# Patient Record
Sex: MALE | Race: WHITE
[De-identification: names, ages, dates, MRNs, and addresses within clinical notes are randomized per-mention and may not be internally consistent; named-entity substitution may affect disease eponyms.]

---

## 2019-07-25 ENCOUNTER — HOSPITAL ENCOUNTER (INPATIENT)
Dept: HOSPITAL 35 - 2N | Age: 69
LOS: 4 days | Discharge: HOME | DRG: 314 | End: 2019-07-29
Attending: INTERNAL MEDICINE | Admitting: INTERNAL MEDICINE
Payer: COMMERCIAL

## 2019-07-25 VITALS — WEIGHT: 193.1 LBS | BODY MASS INDEX: 27.03 KG/M2 | HEIGHT: 71 IN

## 2019-07-25 VITALS — DIASTOLIC BLOOD PRESSURE: 62 MMHG | SYSTOLIC BLOOD PRESSURE: 123 MMHG

## 2019-07-25 DIAGNOSIS — I11.0: ICD-10-CM

## 2019-07-25 DIAGNOSIS — T82.538A: Primary | ICD-10-CM

## 2019-07-25 DIAGNOSIS — Z87.891: ICD-10-CM

## 2019-07-25 DIAGNOSIS — Z95.2: ICD-10-CM

## 2019-07-25 DIAGNOSIS — E78.5: ICD-10-CM

## 2019-07-25 DIAGNOSIS — D68.59: ICD-10-CM
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
## 2019-07-25 DIAGNOSIS — E78.00: ICD-10-CM

## 2019-07-25 DIAGNOSIS — I49.5: ICD-10-CM

## 2019-07-25 DIAGNOSIS — Z95.0: ICD-10-CM

## 2019-07-25 DIAGNOSIS — I50.23: ICD-10-CM

## 2019-07-25 DIAGNOSIS — E87.6: ICD-10-CM

## 2019-07-25 DIAGNOSIS — I48.0: ICD-10-CM
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
## 2019-07-25 DIAGNOSIS — T81.31XA: ICD-10-CM

## 2019-07-25 LAB
ALBUMIN SERPL-MCNC: 4.2 G/DL (ref 3.4–5)
ALT SERPL-CCNC: 30 U/L (ref 30–65)
ANION GAP SERPL CALC-SCNC: 8 MMOL/L (ref 7–16)
AST SERPL-CCNC: 30 U/L (ref 15–37)
BASOPHILS NFR BLD AUTO: 1 % (ref 0–2)
BILIRUB SERPL-MCNC: 1.8 MG/DL
BUN SERPL-MCNC: 19 MG/DL (ref 7–18)
CALCIUM SERPL-MCNC: 9.4 MG/DL (ref 8.5–10.1)
CHLORIDE SERPL-SCNC: 90 MMOL/L (ref 98–107)
CO2 SERPL-SCNC: 31 MMOL/L (ref 21–32)
CREAT SERPL-MCNC: 1 MG/DL (ref 0.7–1.3)
EOSINOPHIL NFR BLD: 0.5 % (ref 0–3)
ERYTHROCYTE [DISTWIDTH] IN BLOOD BY AUTOMATED COUNT: 15.4 % (ref 10.5–14.5)
GLUCOSE SERPL-MCNC: 94 MG/DL (ref 74–106)
GRANULOCYTES NFR BLD MANUAL: 72.3 % (ref 36–66)
HCT VFR BLD CALC: 39.8 % (ref 42–52)
HGB BLD-MCNC: 13.4 GM/DL (ref 14–18)
LYMPHOCYTES NFR BLD AUTO: 17.6 % (ref 24–44)
MCH RBC QN AUTO: 31.8 PG (ref 26–34)
MCHC RBC AUTO-ENTMCNC: 33.8 G/DL (ref 28–37)
MCV RBC: 93.9 FL (ref 80–100)
MONOCYTES NFR BLD: 8.6 % (ref 1–8)
NEUTROPHILS # BLD: 5.6 THOU/UL (ref 1.4–8.2)
PLATELET # BLD: 152 THOU/UL (ref 150–400)
POTASSIUM SERPL-SCNC: 3.4 MMOL/L (ref 3.5–5.1)
PROT SERPL-MCNC: 8.1 G/DL (ref 6.4–8.2)
RBC # BLD AUTO: 4.23 MIL/UL (ref 4.5–6)
SODIUM SERPL-SCNC: 129 MMOL/L (ref 136–145)
WBC # BLD AUTO: 7.7 THOU/UL (ref 4–11)

## 2019-07-25 PROCEDURE — 10081 I&D PILONIDAL CYST COMP: CPT

## 2019-07-25 PROCEDURE — B24BZZ4 ULTRASONOGRAPHY OF HEART WITH AORTA, TRANSESOPHAGEAL: ICD-10-PCS

## 2019-07-25 PROCEDURE — 10797: CPT

## 2019-07-25 NOTE — NUR
PATIENT A DIRECT ADMIT. ADMISSION HX AND ASSESSMENT COMPLETED. ORDERS NOTED.
VSS. DENIED HAVING PAIN OR DISCOMFORT. PT ORIENTED TO THE ROOM AND THE CALL
SYSTEM. WILL CONTINUE TO MONITOR.

## 2019-07-26 VITALS — DIASTOLIC BLOOD PRESSURE: 51 MMHG | SYSTOLIC BLOOD PRESSURE: 125 MMHG

## 2019-07-26 VITALS — SYSTOLIC BLOOD PRESSURE: 112 MMHG | DIASTOLIC BLOOD PRESSURE: 60 MMHG

## 2019-07-26 VITALS — DIASTOLIC BLOOD PRESSURE: 53 MMHG | SYSTOLIC BLOOD PRESSURE: 111 MMHG

## 2019-07-26 VITALS — SYSTOLIC BLOOD PRESSURE: 109 MMHG | DIASTOLIC BLOOD PRESSURE: 56 MMHG

## 2019-07-26 LAB
INR PPP: 2.4
PROTHROMBIN TIME: 25.2 SECONDS (ref 9.3–11.4)

## 2019-07-26 NOTE — NUR
NO OVERNIGHT EVENTS. PT TO HAVE AUREA TODAY, AND HAS BEEN NPO S[place]CE [place] IN
PREP FOR PROCEDURE. PT WAS CONCERNED ABOUT TAKING LASIX SO SHORTLY BEFORE
PROCEDURE WHERE HE WOULD NEED TO VOID DURING PROCEDURE. LASIX GIVEN A FEW
HOURS EARLY, BUT PT IS STILL CONCERNED. VERIFIED THAT PT KNEW WHAT THE
PROCEDURE WAS AND CONSENT SIGNED. WILL CONTINUE TO MONITOR.

## 2019-07-26 NOTE — NUR
ASSUMED CARE @ 0700. PT ALERT AND ORIENTED. VSS. DENIED HAVING PAIN OR
DISCOMFORT. HAD AUREA THIS AM. NO CARDIAC OR RESPIRATORY DISTRESS NOTED. WILL
CONTINUE TO MONITOR.

## 2019-07-27 VITALS — DIASTOLIC BLOOD PRESSURE: 60 MMHG | SYSTOLIC BLOOD PRESSURE: 119 MMHG

## 2019-07-27 VITALS — DIASTOLIC BLOOD PRESSURE: 59 MMHG | SYSTOLIC BLOOD PRESSURE: 117 MMHG

## 2019-07-27 VITALS — DIASTOLIC BLOOD PRESSURE: 59 MMHG | SYSTOLIC BLOOD PRESSURE: 123 MMHG

## 2019-07-27 VITALS — SYSTOLIC BLOOD PRESSURE: 121 MMHG | DIASTOLIC BLOOD PRESSURE: 79 MMHG

## 2019-07-27 VITALS — DIASTOLIC BLOOD PRESSURE: 58 MMHG | SYSTOLIC BLOOD PRESSURE: 121 MMHG

## 2019-07-27 LAB
ANION GAP SERPL CALC-SCNC: 10 MMOL/L (ref 7–16)
BUN SERPL-MCNC: 21 MG/DL (ref 7–18)
CALCIUM SERPL-MCNC: 9.2 MG/DL (ref 8.5–10.1)
CHLORIDE SERPL-SCNC: 95 MMOL/L (ref 98–107)
CO2 SERPL-SCNC: 27 MMOL/L (ref 21–32)
CREAT SERPL-MCNC: 0.9 MG/DL (ref 0.7–1.3)
GLUCOSE SERPL-MCNC: 102 MG/DL (ref 74–106)
INR PPP: 1.7
POTASSIUM SERPL-SCNC: 3.5 MMOL/L (ref 3.5–5.1)
PROTHROMBIN TIME: 18.1 SECONDS (ref 9.3–11.4)
SODIUM SERPL-SCNC: 132 MMOL/L (ref 136–145)

## 2019-07-27 NOTE — NUR
PT ALERT AND ORIENTED. VSS. DENIED HAVING PAIN OR DISCOMFORT. NO CARDIAC OR
RESPIRATORY DISTRESS NOTED. WILL CONTINUE TO MONITOR.

## 2019-07-28 VITALS — SYSTOLIC BLOOD PRESSURE: 113 MMHG | DIASTOLIC BLOOD PRESSURE: 61 MMHG

## 2019-07-28 VITALS — DIASTOLIC BLOOD PRESSURE: 56 MMHG | SYSTOLIC BLOOD PRESSURE: 109 MMHG

## 2019-07-28 VITALS — DIASTOLIC BLOOD PRESSURE: 57 MMHG | SYSTOLIC BLOOD PRESSURE: 118 MMHG

## 2019-07-28 VITALS — SYSTOLIC BLOOD PRESSURE: 114 MMHG | DIASTOLIC BLOOD PRESSURE: 54 MMHG

## 2019-07-28 VITALS — SYSTOLIC BLOOD PRESSURE: 117 MMHG | DIASTOLIC BLOOD PRESSURE: 58 MMHG

## 2019-07-28 LAB
ANION GAP SERPL CALC-SCNC: 10 MMOL/L (ref 7–16)
BUN SERPL-MCNC: 21 MG/DL (ref 7–18)
CALCIUM SERPL-MCNC: 9.6 MG/DL (ref 8.5–10.1)
CHLORIDE SERPL-SCNC: 95 MMOL/L (ref 98–107)
CO2 SERPL-SCNC: 27 MMOL/L (ref 21–32)
CREAT SERPL-MCNC: 0.8 MG/DL (ref 0.7–1.3)
GLUCOSE SERPL-MCNC: 109 MG/DL (ref 74–106)
INR PPP: 1.4
POTASSIUM SERPL-SCNC: 3.8 MMOL/L (ref 3.5–5.1)
PROTHROMBIN TIME: 15 SECONDS (ref 9.3–11.4)
SODIUM SERPL-SCNC: 132 MMOL/L (ref 136–145)

## 2019-07-28 NOTE — NUR
ASSUMED PT CARE AT 1900. PT A/OX4, VITAL SIGNS STABLE, ASSESSMENT AS CHARTED.
NO COMPLAINTS OF PAIN. RESTED WELL THROUGH THE NIGHT. NO ACUTE CHANGES THROUGH
THE NIGHT. PROGRESSING TOWARD PLAN OF CARE. WILL KILEY TO MONITOR.

## 2019-07-28 NOTE — NUR
ASSESSMENT AS CHARTED. PT ALERT AND ORIENTED. VSS. DENIED HAVING PAIN OR
DISCOMFORT. NO CONCERNS AT THIS TIME. WILL CONTINUE TO MONITOR.

## 2019-07-29 VITALS — SYSTOLIC BLOOD PRESSURE: 122 MMHG | DIASTOLIC BLOOD PRESSURE: 50 MMHG

## 2019-07-29 VITALS — DIASTOLIC BLOOD PRESSURE: 57 MMHG | SYSTOLIC BLOOD PRESSURE: 117 MMHG

## 2019-07-29 VITALS — DIASTOLIC BLOOD PRESSURE: 50 MMHG | SYSTOLIC BLOOD PRESSURE: 122 MMHG

## 2019-07-29 LAB
ANION GAP SERPL CALC-SCNC: 9 MMOL/L (ref 7–16)
BUN SERPL-MCNC: 21 MG/DL (ref 7–18)
CALCIUM SERPL-MCNC: 10 MG/DL (ref 8.5–10.1)
CHLORIDE SERPL-SCNC: 93 MMOL/L (ref 98–107)
CO2 SERPL-SCNC: 31 MMOL/L (ref 21–32)
CREAT SERPL-MCNC: 0.9 MG/DL (ref 0.7–1.3)
GLUCOSE SERPL-MCNC: 136 MG/DL (ref 74–106)
INR PPP: 1.4
POTASSIUM SERPL-SCNC: 3.5 MMOL/L (ref 3.5–5.1)
PROTHROMBIN TIME: 14.4 SECONDS (ref 9.3–11.4)
SODIUM SERPL-SCNC: 133 MMOL/L (ref 136–145)

## 2019-07-29 NOTE — NUR
ASSUMED PT CARE AT 1900. PT A/OX4, VITALS SIGNS STABLE. ASSESSMENT AS CHARTED.
NO COMPLAINTS OF PAIN. REQUESTED HIS MEDS EARLY SO HE COULD SLEEP. PT RESTED
WELL THROUGH THE NIGHT. PROGRESSING WELL TOWARDS PLAN OF CARE. WILL CONTINUE
TO MONITOR.

## 2019-07-29 NOTE — NUR
ASSUMED CARE OF PT AT SHIFT CHANGE ASSESSMENT AS CHARTED. MEDS GIVEN PER MAR.
PT ALERT AND ORIENTED, VSS, DENIES PAIN, DENIES SOB/CP, O2 SATS WNL ON RA. PT
UP AD TOMMY TOLERATES WELL. DC ORDERS ACKNOWLEDGED AND IMPLEMENTED. DC PAPERWORK
DISCUSSED WITH PT, COMMUNICATES UNDERSTANDING. PT LEFT UNIT AT APPROX 1115
WITH SPOUSE AND ALL BELONGINGS. IV REMOVED, TELE REMOVED.

## 2019-09-16 ENCOUNTER — HOSPITAL ENCOUNTER (OUTPATIENT)
Dept: HOSPITAL 61 - PCVCIMAG | Age: 69
Discharge: HOME | End: 2019-09-16
Attending: INTERNAL MEDICINE
Payer: MEDICARE

## 2019-09-16 DIAGNOSIS — I48.0: ICD-10-CM

## 2019-09-16 DIAGNOSIS — I49.5: ICD-10-CM

## 2019-09-16 DIAGNOSIS — I11.0: Primary | ICD-10-CM

## 2019-09-16 DIAGNOSIS — I49.3: ICD-10-CM

## 2019-09-16 DIAGNOSIS — Z95.0: ICD-10-CM

## 2019-09-16 DIAGNOSIS — I35.0: ICD-10-CM

## 2019-09-16 DIAGNOSIS — A69.20: ICD-10-CM

## 2019-09-16 DIAGNOSIS — I50.23: ICD-10-CM

## 2019-09-16 DIAGNOSIS — Z95.2: ICD-10-CM

## 2019-09-16 DIAGNOSIS — I42.9: ICD-10-CM

## 2019-09-16 PROCEDURE — 80061 LIPID PANEL: CPT

## 2019-09-16 PROCEDURE — G0463 HOSPITAL OUTPT CLINIC VISIT: HCPCS

## 2019-09-16 PROCEDURE — 36415 COLL VENOUS BLD VENIPUNCTURE: CPT

## 2019-09-16 PROCEDURE — 93005 ELECTROCARDIOGRAM TRACING: CPT

## 2019-09-16 PROCEDURE — 93306 TTE W/DOPPLER COMPLETE: CPT

## 2019-09-16 NOTE — PCVCIMAG
--------------- APPROVED REPORT --------------





Study performed:  2019 09:57:24



EXAM: Comprehensive 2D, Doppler, and color-flow 

Echocardiogram

Patient Location: Echo lab

Status:  routine



BSA:         2.09

HR: 81 bpmBP:          110/56 mmHg

Rhythm: Pacemaker, PVCs



Other Information 

Study Quality: Adequate



Indications

Congestive Heart Failure

Atrial Fibrillation

Pacemaker

[employer]. Pedrito bioprosthetic aortic valve replacement



2D Dimensions

IVSd:  12.89 (7-11mm)LVOT Diam:  22.58 (18-24mm) 

LVDd:  60.69 mm

PWd:  10.99 (7-11mm)Ascending Ao:  44.27 (22-36mm)

LVDs:  44.96 (25-40mm)

Left Atrium:  46.83 (27-40mm)

Aortic Root:  41.41 mm

LV Single Plane 4CH:  31.57 %

LV Single Plane 2CH:  45.21 %



Volumes

Left Atrial Volume (Systole)

Single Plane 4CH:  95.36 mLSingle Plane 2CH:  152.63 mL

LA ESV Index:  58.00 mL/m2



Aortic Valve

AoV Peak Hardy.:  2.60 m/s

AO Peak Gr.:  27.22 mmHgLVOT Max P.50 mmHg

AO Mean Gr.:  13.08 mmHgLVOT Mean P.69 mmHg

AO V2 Mean:  1.64 m/sLVOT Max V:  1.17 m/s

AO V2 VTI:  54.35 cmLVOT Mean V:  0.76 m/s

MARIA DEL CARMEN (VTI):  1.86 [id number] V1 VTI:  25.30 cm

MARIA DEL CARMEN Vmax: 1.80 cm2

SV (LVOT):  101.25 mL



Mitral Valve

IVRT:  103.81 ms



Pulmonary Valve

PV Peak Hardy.:  0.79 m/sPV Peak Gr.:  2.59 mmHg



Tricuspid Valve

TR Peak Hardy.:  3.61 m/s

TR Peak Gr.:  52.70 mmHg



Left Ventricle

Left ventricle is mildly dilated. There is normal LV segmental wall 

motion. Borderline concentric left ventricular hypertrophy. Left 

ventricular systolic function is mild to moderately decreased. LVEF 

is 40%. This study is not technically sufficient to allow evaluation 

of the LV diastolic function due to atrial fibrillation, PVCs.



Right Ventricle

The right ventricle is normal size. The right ventricular systolic 

function is normal. Pacemaker lead is present in the right ventricle. 



Atria

Left atrium is severely dilated. Right atrium is moderately dilated. 

Pacemaker lead is present in the right atrium.



Aortic Valve

[employer]. Pedrito bioprosthetic valve in the aortic position. Moderate aortic 

regurgitation. There is trace valvular aortic stenosis. Calculated 

aortic valve area is 1.8 cm2 with maximum pressure gradient of 27 

mmHg and mean pressure gradient of 13 mmHg.



Mitral Valve

The mitral valve is normal in structure. Moderate mitral 

regurgitation. No evidence of mitral valve stenosis.



Tricuspid Valve

The tricuspid valve is normal in structure. Moderate tricuspid 

regurgitation with PAP of 63 mmHg.



Pulmonic Valve

The pulmonary valve is normal in structure. Mild to moderate pulmonic 

regurgitation.



Great Vessels

Aortic root is dilated to 4.1 cm. Ascending aorta is dilated to 4.4 

cm. IVC is dilated and collapses >50% with 

inspiration.



Pericardium

There is no pericardial effusion. There is no pleural 

effusion.



<Conclusion>

Left ventricle is mildly dilated.

Borderline concentric left ventricular hypertrophy.

LVEF is 40%.

This study is not technically sufficient to allow evaluation of the 

LV diastolic function due to atrial fibrillation, PVCs.

The right ventricle is normal size.

Left atrium is severely dilated.

Pacemaker lead is present in the right ventricle.

Right atrium is moderately dilated.

Pacemaker lead is present in the right atrium.

St. Pedrito bioprosthetic valve in the aortic position.

Moderate aortic regurgitation.

There is trace valvular aortic stenosis. 

Calculated aortic valve area is 1.8 cm2 with maximum pressure 

gradient of 27 mmHg and mean pressure gradient of 13 mmHg.

Moderate mitral regurgitation.

Moderate tricuspid regurgitation with PAP of 63 mmHg.

Aortic root is dilated to 4.1 cm.

Ascending aorta is dilated to 4.4 cm.

There is no pericardial effusion.

## 2019-10-28 ENCOUNTER — HOSPITAL ENCOUNTER (INPATIENT)
Dept: HOSPITAL 90 - EDH | Age: 69
LOS: 2 days | Discharge: HOME | DRG: 151 | End: 2019-10-30
Attending: INTERNAL MEDICINE | Admitting: INTERNAL MEDICINE
Payer: MEDICARE

## 2019-10-28 VITALS — HEIGHT: 71 IN | BODY MASS INDEX: 24.51 KG/M2 | WEIGHT: 175.1 LBS

## 2019-10-28 DIAGNOSIS — D53.9: ICD-10-CM

## 2019-10-28 DIAGNOSIS — E11.9: ICD-10-CM

## 2019-10-28 DIAGNOSIS — T45.515A: ICD-10-CM

## 2019-10-28 DIAGNOSIS — I50.22: ICD-10-CM

## 2019-10-28 DIAGNOSIS — Z79.899: ICD-10-CM

## 2019-10-28 DIAGNOSIS — R04.0: Primary | ICD-10-CM

## 2019-10-28 DIAGNOSIS — F10.10: ICD-10-CM

## 2019-10-28 DIAGNOSIS — A77.0: ICD-10-CM

## 2019-10-28 DIAGNOSIS — I48.91: ICD-10-CM

## 2019-10-28 DIAGNOSIS — N17.9: ICD-10-CM

## 2019-10-28 DIAGNOSIS — Z95.0: ICD-10-CM

## 2019-10-28 DIAGNOSIS — D62: ICD-10-CM

## 2019-10-28 DIAGNOSIS — Z95.3: ICD-10-CM

## 2019-10-28 DIAGNOSIS — A69.20: ICD-10-CM

## 2019-10-28 DIAGNOSIS — E78.5: ICD-10-CM

## 2019-10-28 DIAGNOSIS — I11.0: ICD-10-CM

## 2019-10-28 DIAGNOSIS — E87.1: ICD-10-CM

## 2019-10-28 DIAGNOSIS — I35.0: ICD-10-CM

## 2019-10-28 DIAGNOSIS — Z79.01: ICD-10-CM

## 2019-10-28 DIAGNOSIS — M10.9: ICD-10-CM

## 2019-10-28 LAB
APTT PPP: 30.2 SEC (ref 26.3–35.5)
BASOPHILS NFR BLD AUTO: 0.3 % (ref 0–5)
BUN SERPL-MCNC: 69 MG/DL (ref 7–18)
CHLORIDE SERPL-SCNC: 95 MMOL/L (ref 101–111)
CO2 SERPL-SCNC: 32 MMOL/L (ref 21–32)
CREAT SERPL-MCNC: 2 MG/DL (ref 0.5–1.5)
EOSINOPHIL NFR BLD AUTO: 1.9 % (ref 0–8)
ERYTHROCYTE [DISTWIDTH] IN BLOOD BY AUTOMATED COUNT: 17.9 % (ref 11–15.5)
GFR SERPL CREATININE-BSD FRML MDRD: 35 ML/MIN (ref 60–?)
GLUCOSE SERPL-MCNC: 121 MG/DL (ref 70–105)
HCT VFR BLD AUTO: 24.5 % (ref 42–54)
INR PPP: 1.15 (ref 0.85–1.15)
IRON SATN MFR SERPL: 42.6 % (ref 30–44)
IRON SERPL-MCNC: 152 MCG/DL (ref 65–175)
LYMPHOCYTES NFR SPEC AUTO: 18.6 % (ref 21–51)
MCH RBC QN AUTO: 37.4 PG (ref 27–33)
MCHC RBC AUTO-ENTMCNC: 35.2 G/DL (ref 32–36)
MCV RBC AUTO: 106.3 FL (ref 79–99)
MONOCYTES NFR BLD AUTO: 9.6 % (ref 3–13)
NEUTROPHILS NFR BLD AUTO: 69.6 % (ref 40–77)
NRBC BLD MANUAL-RTO: 0.1 % (ref 0–0.19)
PLATELET # BLD AUTO: 195 K/UL (ref 130–400)
POTASSIUM SERPL-SCNC: 5.1 MMOL/L (ref 3.5–5.1)
PROTHROMBIN TIME: 12 SEC (ref 9.6–11.6)
RBC # BLD AUTO: 2.3 MIL/UL (ref 4.5–6.2)
RBC MORPH BLD: (no result)
SODIUM SERPL-SCNC: 133 MMOL/L (ref 136–145)
TIBC SERPL-MCNC: 356 MCG/DL (ref 250–450)
WBC # BLD AUTO: 7 K/UL (ref 4.8–10.8)

## 2019-10-28 PROCEDURE — 36415 COLL VENOUS BLD VENIPUNCTURE: CPT

## 2019-10-28 PROCEDURE — 83540 ASSAY OF IRON: CPT

## 2019-10-28 PROCEDURE — 86901 BLOOD TYPING SEROLOGIC RH(D): CPT

## 2019-10-28 PROCEDURE — 86900 BLOOD TYPING SEROLOGIC ABO: CPT

## 2019-10-28 PROCEDURE — 80048 BASIC METABOLIC PNL TOTAL CA: CPT

## 2019-10-28 PROCEDURE — 85610 PROTHROMBIN TIME: CPT

## 2019-10-28 PROCEDURE — 84484 ASSAY OF TROPONIN QUANT: CPT

## 2019-10-28 PROCEDURE — 80053 COMPREHEN METABOLIC PANEL: CPT

## 2019-10-28 PROCEDURE — 84443 ASSAY THYROID STIM HORMONE: CPT

## 2019-10-28 PROCEDURE — 86850 RBC ANTIBODY SCREEN: CPT

## 2019-10-28 PROCEDURE — 85730 THROMBOPLASTIN TIME PARTIAL: CPT

## 2019-10-28 PROCEDURE — 85025 COMPLETE CBC W/AUTO DIFF WBC: CPT

## 2019-10-28 PROCEDURE — 85014 HEMATOCRIT: CPT

## 2019-10-28 PROCEDURE — 83550 IRON BINDING TEST: CPT

## 2019-10-28 PROCEDURE — 82948 REAGENT STRIP/BLOOD GLUCOSE: CPT

## 2019-10-28 PROCEDURE — 85018 HEMOGLOBIN: CPT

## 2019-10-29 LAB
ALBUMIN SERPL-MCNC: 3.7 G/DL (ref 3.5–5)
ALT SERPL-CCNC: 24 U/L (ref 12–78)
AST SERPL-CCNC: 26 U/L (ref 10–37)
BASOPHILS NFR BLD AUTO: 0.7 % (ref 0–5)
BILIRUB SERPL-MCNC: 1.6 MG/DL (ref 0.2–1)
BUN SERPL-MCNC: 52 MG/DL (ref 7–18)
CHLORIDE SERPL-SCNC: 100 MMOL/L (ref 101–111)
CO2 SERPL-SCNC: 27 MMOL/L (ref 21–32)
CREAT SERPL-MCNC: 1.3 MG/DL (ref 0.5–1.5)
EOSINOPHIL NFR BLD AUTO: 1.3 % (ref 0–8)
ERYTHROCYTE [DISTWIDTH] IN BLOOD BY AUTOMATED COUNT: 17.4 % (ref 11–15.5)
GFR SERPL CREATININE-BSD FRML MDRD: 58 ML/MIN (ref 60–?)
GLUCOSE SERPL-MCNC: 119 MG/DL (ref 70–105)
HCT VFR BLD AUTO: 24 % (ref 42–54)
HCT VFR BLD AUTO: 24.2 % (ref 42–54)
HCT VFR BLD AUTO: 24.3 % (ref 42–54)
HCT VFR BLD AUTO: 25.7 % (ref 42–54)
LYMPHOCYTES NFR SPEC AUTO: 18.5 % (ref 21–51)
MCH RBC QN AUTO: 36.9 PG (ref 27–33)
MCHC RBC AUTO-ENTMCNC: 34.8 G/DL (ref 32–36)
MCV RBC AUTO: 105.8 FL (ref 79–99)
MONOCYTES NFR BLD AUTO: 10 % (ref 3–13)
NEUTROPHILS NFR BLD AUTO: 69.5 % (ref 40–77)
NRBC BLD MANUAL-RTO: 0 % (ref 0–0.19)
PLATELET # BLD AUTO: 193 K/UL (ref 130–400)
POTASSIUM SERPL-SCNC: 4.6 MMOL/L (ref 3.5–5.1)
PROT SERPL-MCNC: 7.4 G/DL (ref 6–8.3)
RBC # BLD AUTO: 2.27 MIL/UL (ref 4.5–6.2)
SODIUM SERPL-SCNC: 138 MMOL/L (ref 136–145)
WBC # BLD AUTO: 6 K/UL (ref 4.8–10.8)

## 2019-10-29 RX ADMIN — ALLOPURINOL SCH MG: 100 TABLET ORAL at 17:00

## 2019-10-29 NOTE — NUR
DCP: HOME



 met with pt who lives with his wife Mary Crews . Pt repots he uses a cane 
prn, related to his gout and dizzy episodes from medication. Pt is able to complete ADLS, no 
in home care services. PCP is BROOK Reyna and he uses ail order rx. Plan is home at [place]

-------------------------------------------------------------------------------

Addendum: 10/29/19 at 1116 by ZORAN BOJORQUEZ 

-------------------------------------------------------------------------------

Amended: Links added.

## 2019-10-30 VITALS — SYSTOLIC BLOOD PRESSURE: 116 MMHG | DIASTOLIC BLOOD PRESSURE: 61 MMHG

## 2019-10-30 VITALS — DIASTOLIC BLOOD PRESSURE: 48 MMHG | SYSTOLIC BLOOD PRESSURE: 93 MMHG

## 2019-10-30 LAB
BUN SERPL-MCNC: 49 MG/DL (ref 7–18)
CHLORIDE SERPL-SCNC: 101 MMOL/L (ref 101–111)
CO2 SERPL-SCNC: 28 MMOL/L (ref 21–32)
CREAT SERPL-MCNC: 1.6 MG/DL (ref 0.5–1.5)
GFR SERPL CREATININE-BSD FRML MDRD: 46 ML/MIN (ref 60–?)
GLUCOSE SERPL-MCNC: 160 MG/DL (ref 70–105)
HCT VFR BLD AUTO: 25.1 % (ref 42–54)
HCT VFR BLD AUTO: 25.9 % (ref 42–54)
POTASSIUM SERPL-SCNC: 4.4 MMOL/L (ref 3.5–5.1)
SODIUM SERPL-SCNC: 138 MMOL/L (ref 136–145)

## 2019-10-30 RX ADMIN — METFORMIN HYDROCHLORIDE SCH MG: 500 TABLET ORAL at 08:00

## 2019-10-30 RX ADMIN — ALLOPURINOL SCH MG: 100 TABLET ORAL at 17:00

## 2019-10-30 RX ADMIN — SODIUM CHLORIDE SCH UNIT: 9 INJECTION, SOLUTION INTRAVENOUS at 16:30

## 2019-10-30 RX ADMIN — SODIUM CHLORIDE SCH UNIT: 9 INJECTION, SOLUTION INTRAVENOUS at 11:30

## 2019-10-30 RX ADMIN — METFORMIN HYDROCHLORIDE SCH MG: 500 TABLET ORAL at 17:00

## 2020-06-06 ENCOUNTER — HOSPITAL ENCOUNTER (INPATIENT)
Dept: HOSPITAL 35 - 2N | Age: 70
LOS: 5 days | Discharge: ANOTHER HOSPITAL | DRG: 377 | End: 2020-06-11
Attending: INTERNAL MEDICINE | Admitting: INTERNAL MEDICINE
Payer: COMMERCIAL

## 2020-06-06 VITALS — DIASTOLIC BLOOD PRESSURE: 51 MMHG | SYSTOLIC BLOOD PRESSURE: 107 MMHG

## 2020-06-06 VITALS — DIASTOLIC BLOOD PRESSURE: 50 MMHG | SYSTOLIC BLOOD PRESSURE: 100 MMHG

## 2020-06-06 VITALS — SYSTOLIC BLOOD PRESSURE: 113 MMHG | DIASTOLIC BLOOD PRESSURE: 47 MMHG

## 2020-06-06 VITALS — SYSTOLIC BLOOD PRESSURE: 98 MMHG | DIASTOLIC BLOOD PRESSURE: 47 MMHG

## 2020-06-06 VITALS — SYSTOLIC BLOOD PRESSURE: 110 MMHG | DIASTOLIC BLOOD PRESSURE: 55 MMHG

## 2020-06-06 VITALS — DIASTOLIC BLOOD PRESSURE: 50 MMHG | SYSTOLIC BLOOD PRESSURE: 112 MMHG

## 2020-06-06 VITALS — BODY MASS INDEX: 26.43 KG/M2 | WEIGHT: 188.8 LBS | HEIGHT: 70.98 IN

## 2020-06-06 VITALS — DIASTOLIC BLOOD PRESSURE: 48 MMHG | SYSTOLIC BLOOD PRESSURE: 114 MMHG

## 2020-06-06 DIAGNOSIS — E43: ICD-10-CM

## 2020-06-06 DIAGNOSIS — M10.9: ICD-10-CM

## 2020-06-06 DIAGNOSIS — E78.5: ICD-10-CM

## 2020-06-06 DIAGNOSIS — K44.9: ICD-10-CM

## 2020-06-06 DIAGNOSIS — K22.70: ICD-10-CM

## 2020-06-06 DIAGNOSIS — I42.9: ICD-10-CM

## 2020-06-06 DIAGNOSIS — K64.8: ICD-10-CM

## 2020-06-06 DIAGNOSIS — D36.9: ICD-10-CM

## 2020-06-06 DIAGNOSIS — D68.9: ICD-10-CM

## 2020-06-06 DIAGNOSIS — Z71.41: ICD-10-CM

## 2020-06-06 DIAGNOSIS — Z80.0: ICD-10-CM

## 2020-06-06 DIAGNOSIS — D62: ICD-10-CM

## 2020-06-06 DIAGNOSIS — N40.0: ICD-10-CM

## 2020-06-06 DIAGNOSIS — N17.0: ICD-10-CM

## 2020-06-06 DIAGNOSIS — Z95.0: ICD-10-CM

## 2020-06-06 DIAGNOSIS — Z20.828: ICD-10-CM

## 2020-06-06 DIAGNOSIS — I35.0: ICD-10-CM

## 2020-06-06 DIAGNOSIS — F43.10: ICD-10-CM

## 2020-06-06 DIAGNOSIS — I25.10: ICD-10-CM

## 2020-06-06 DIAGNOSIS — I48.0: ICD-10-CM

## 2020-06-06 DIAGNOSIS — K63.5: ICD-10-CM

## 2020-06-06 DIAGNOSIS — I49.5: ICD-10-CM

## 2020-06-06 DIAGNOSIS — Z79.01: ICD-10-CM

## 2020-06-06 DIAGNOSIS — E11.9: ICD-10-CM

## 2020-06-06 DIAGNOSIS — I11.0: ICD-10-CM

## 2020-06-06 DIAGNOSIS — K29.71: Primary | ICD-10-CM

## 2020-06-06 DIAGNOSIS — Z95.2: ICD-10-CM

## 2020-06-06 DIAGNOSIS — I50.22: ICD-10-CM

## 2020-06-06 LAB
ALBUMIN SERPL-MCNC: 3.1 G/DL (ref 3.4–5)
ALT SERPL-CCNC: 18 U/L (ref 30–65)
ANION GAP SERPL CALC-SCNC: 5 MMOL/L (ref 7–16)
ANISOCYTOSIS BLD QL SMEAR: (no result)
AST SERPL-CCNC: 23 U/L (ref 15–37)
BASO STIPL BLD QL SMEAR: (no result)
BILIRUB SERPL-MCNC: 0.4 MG/DL (ref 0.2–1)
BUN SERPL-MCNC: 126 MG/DL (ref 7–18)
CALCIUM SERPL-MCNC: 8.8 MG/DL (ref 8.5–10.1)
CHLORIDE SERPL-SCNC: 106 MMOL/L (ref 98–107)
CO2 SERPL-SCNC: 28 MMOL/L (ref 21–32)
CREAT SERPL-MCNC: 2.6 MG/DL (ref 0.7–1.3)
EOSINOPHIL NFR BLD: 1 % (ref 0–3)
ERYTHROCYTE [DISTWIDTH] IN BLOOD BY AUTOMATED COUNT: 16.6 % (ref 10.5–14.5)
GLUCOSE SERPL-MCNC: 124 MG/DL (ref 74–106)
GRANULOCYTES NFR BLD MANUAL: 79 % (ref 36–66)
HCT VFR BLD CALC: 15.1 % (ref 42–52)
HGB BLD-MCNC: 5.2 GM/DL (ref 14–18)
IRON SERPL-MCNC: 60 UG/DL (ref 65–175)
LYMPHOCYTES NFR BLD AUTO: 12 % (ref 24–44)
MACROCYTES: (no result)
MCH RBC QN AUTO: 35.3 PG (ref 26–34)
MCHC RBC AUTO-ENTMCNC: 34.3 G/DL (ref 28–37)
MCV RBC: 103.1 FL (ref 80–100)
MONOCYTES NFR BLD: 5 % (ref 1–8)
NEUTROPHILS # BLD: 7.1 THOU/UL (ref 1.4–8.2)
NEUTS BAND NFR BLD: 3 % (ref 0–8)
OBSERVED RETIC COUNT: 5.58 % (ref 0.6–2.6)
PHOSPHATE SERPL-MCNC: 3.6 MG/DL (ref 2.5–4.9)
PLATELET # BLD EST: NORMAL 10*3/UL
PLATELET # BLD: 163 THOU/UL (ref 150–400)
POTASSIUM SERPL-SCNC: 5 MMOL/L (ref 3.5–5.1)
PROT SERPL-MCNC: 6 G/DL (ref 6.4–8.2)
RBC # BLD AUTO: 1.47 MIL/UL (ref 4.5–6)
SAO2 % BLD FROM PO2: 24 % (ref 20–39)
SODIUM SERPL-SCNC: 139 MMOL/L (ref 136–145)
TIBC SERPL-MCNC: 255 UG/DL (ref 250–450)
WBC # BLD AUTO: 8.7 THOU/UL (ref 4–11)

## 2020-06-06 PROCEDURE — 0DB38ZX EXCISION OF LOWER ESOPHAGUS, VIA NATURAL OR ARTIFICIAL OPENING ENDOSCOPIC, DIAGNOSTIC: ICD-10-PCS | Performed by: SPECIALIST

## 2020-06-06 PROCEDURE — 70005: CPT

## 2020-06-06 PROCEDURE — 62110: CPT

## 2020-06-06 PROCEDURE — 62900: CPT

## 2020-06-06 PROCEDURE — 10081 I&D PILONIDAL CYST COMP: CPT

## 2020-06-06 PROCEDURE — 0DBK8ZZ EXCISION OF ASCENDING COLON, VIA NATURAL OR ARTIFICIAL OPENING ENDOSCOPIC: ICD-10-PCS

## 2020-06-06 PROCEDURE — 30233N1 TRANSFUSION OF NONAUTOLOGOUS RED BLOOD CELLS INTO PERIPHERAL VEIN, PERCUTANEOUS APPROACH: ICD-10-PCS

## 2020-06-06 PROCEDURE — 0DB68ZX EXCISION OF STOMACH, VIA NATURAL OR ARTIFICIAL OPENING ENDOSCOPIC, DIAGNOSTIC: ICD-10-PCS | Performed by: INTERNAL MEDICINE

## 2020-06-06 PROCEDURE — 0DBL8ZZ EXCISION OF TRANSVERSE COLON, VIA NATURAL OR ARTIFICIAL OPENING ENDOSCOPIC: ICD-10-PCS

## 2020-06-06 NOTE — NUR
PT CARE ASSUMED AT 1550. ASSESSMENT AS CHARTED. MEDICATION AS CHARTED. 1 UNIT
RBC RUNNING UNTIL EMPTY PER DR EL; HALF FULL AS OF 1800. TUBING NOT
COMPATIBLE.

## 2020-06-06 NOTE — NUR
DIRECT ADMIT FROM [facility]. PT CARE ASSUMED AT 1550. ASSESSMENT
AS CHARTED. MEDICATION AS CHARTED. GI BLEED, MALAISE. PT ARRIVED WITH 1 UNIT
RBC'S RUNNING; TUBING NOT COMPATIBLE. DR EL WANTS RBC TO RUN UNTIL
COMPLETE. HX OF ETOH.

## 2020-06-07 VITALS — DIASTOLIC BLOOD PRESSURE: 57 MMHG | SYSTOLIC BLOOD PRESSURE: 120 MMHG

## 2020-06-07 VITALS — SYSTOLIC BLOOD PRESSURE: 118 MMHG | DIASTOLIC BLOOD PRESSURE: 59 MMHG

## 2020-06-07 VITALS — DIASTOLIC BLOOD PRESSURE: 42 MMHG | SYSTOLIC BLOOD PRESSURE: 103 MMHG

## 2020-06-07 VITALS — SYSTOLIC BLOOD PRESSURE: 114 MMHG | DIASTOLIC BLOOD PRESSURE: 53 MMHG

## 2020-06-07 VITALS — DIASTOLIC BLOOD PRESSURE: 53 MMHG | SYSTOLIC BLOOD PRESSURE: 113 MMHG

## 2020-06-07 VITALS — DIASTOLIC BLOOD PRESSURE: 61 MMHG | SYSTOLIC BLOOD PRESSURE: 122 MMHG

## 2020-06-07 VITALS — DIASTOLIC BLOOD PRESSURE: 79 MMHG | SYSTOLIC BLOOD PRESSURE: 126 MMHG

## 2020-06-07 LAB
ALBUMIN SERPL-MCNC: 3.1 G/DL (ref 3.4–5)
ALT SERPL-CCNC: 19 U/L (ref 30–65)
ANION GAP SERPL CALC-SCNC: 5 MMOL/L (ref 7–16)
AST SERPL-CCNC: 23 U/L (ref 15–37)
BASOPHILS NFR BLD AUTO: 0.6 % (ref 0–2)
BASOPHILS NFR BLD AUTO: 0.6 % (ref 0–2)
BILIRUB SERPL-MCNC: 0.6 MG/DL (ref 0.2–1)
BILIRUB UR-MCNC: NEGATIVE MG/DL
BUN SERPL-MCNC: 111 MG/DL (ref 7–18)
CALCIUM SERPL-MCNC: 8.7 MG/DL (ref 8.5–10.1)
CHLORIDE SERPL-SCNC: 107 MMOL/L (ref 98–107)
CO2 SERPL-SCNC: 29 MMOL/L (ref 21–32)
COLOR UR: YELLOW
CREAT SERPL-MCNC: 2.3 MG/DL (ref 0.7–1.3)
EOSINOPHIL NFR BLD: 1.8 % (ref 0–3)
EOSINOPHIL NFR BLD: 2.3 % (ref 0–3)
ERYTHROCYTE [DISTWIDTH] IN BLOOD BY AUTOMATED COUNT: 16.4 % (ref 10.5–14.5)
ERYTHROCYTE [DISTWIDTH] IN BLOOD BY AUTOMATED COUNT: 17.2 % (ref 10.5–14.5)
FOLATE SERPL-MCNC: 49.3 NG/ML (ref 8.6–58.9)
GLUCOSE SERPL-MCNC: 128 MG/DL (ref 74–106)
GRANULOCYTES NFR BLD MANUAL: 67.5 % (ref 36–66)
GRANULOCYTES NFR BLD MANUAL: 74.4 % (ref 36–66)
HCT VFR BLD CALC: 17.4 % (ref 42–52)
HCT VFR BLD CALC: 20.1 % (ref 42–52)
HGB BLD-MCNC: 6 GM/DL (ref 14–18)
HGB BLD-MCNC: 7 GM/DL (ref 14–18)
INR PPP: 1.2
KETONES UR STRIP-MCNC: NEGATIVE MG/DL
LYMPHOCYTES NFR BLD AUTO: 13 % (ref 24–44)
LYMPHOCYTES NFR BLD AUTO: 19.2 % (ref 24–44)
MCH RBC QN AUTO: 34.1 PG (ref 26–34)
MCH RBC QN AUTO: 34.6 PG (ref 26–34)
MCHC RBC AUTO-ENTMCNC: 34.3 G/DL (ref 28–37)
MCHC RBC AUTO-ENTMCNC: 34.7 G/DL (ref 28–37)
MCV RBC: 101 FL (ref 80–100)
MCV RBC: 98.2 FL (ref 80–100)
MONOCYTES NFR BLD: 10.2 % (ref 1–8)
MONOCYTES NFR BLD: 10.4 % (ref 1–8)
NEUTROPHILS # BLD: 4.9 THOU/UL (ref 1.4–8.2)
NEUTROPHILS # BLD: 5.3 THOU/UL (ref 1.4–8.2)
NITRITE UR QL STRIP: NEGATIVE
PLATELET # BLD: 151 THOU/UL (ref 150–400)
PLATELET # BLD: 154 THOU/UL (ref 150–400)
POTASSIUM SERPL-SCNC: 4.4 MMOL/L (ref 3.5–5.1)
PROT SERPL-MCNC: 6 G/DL (ref 6.4–8.2)
PROTHROMBIN TIME: 12 SECONDS (ref 9.3–11.4)
RBC # BLD AUTO: 1.72 MIL/UL (ref 4.5–6)
RBC # BLD AUTO: 2.05 MIL/UL (ref 4.5–6)
RBC # UR STRIP: NEGATIVE /UL
SODIUM SERPL-SCNC: 141 MMOL/L (ref 136–145)
SP GR UR STRIP: 1.01 (ref 1–1.03)
URINE CLARITY: CLEAR
URINE CREATININE-RANDOM*: 30.8 MG/DL
URINE GLUCOSE-RANDOM*: NEGATIVE
URINE LEUKOCYTES: NEGATIVE
URINE PROTEIN (DIPSTICK): NEGATIVE
URINE PROTEIN-RANDOM*: <6 MG/DL (ref ?–11.9)
URINE SODIUM-RANDOM*: 63 MMOL/L
UROBILINOGEN UR STRIP-ACNC: 0.2 E.U./DL (ref 0.2–1)
VIT B12 SERPL-MCNC: > 6000 PG/ML (ref 193–986)
WBC # BLD AUTO: 7.1 THOU/UL (ref 4–11)
WBC # BLD AUTO: 7.3 THOU/UL (ref 4–11)

## 2020-06-07 NOTE — NUR
PT IS ALERT AND ORIENTED X4. LUNGS ARE CLEAR TO DIMINISHED. INFUSING 2ND UNIT
OF BLOOD FOR LOW HEMOGLOBIN UP TO 6.0 NOW CURRENTLY. PT VOIDS PER URINAL AT
BEDSIDE. ABDOMEN IS ROUND AND BOWEL SOUNDS POSITIVE X4. QUADRANTS. BEDSIDE
COMMODE AT BEDSIDE IF NEEDED. DENIES ANY PAIN AT THIS TIME. IF STOOL NOTED
NEED TO COLLECT OCCULT AND SENT TO LAB. NO NEW ORDERS AT THIS TIME PER NURSING

## 2020-06-07 NOTE — NUR
PT CARE ASSUMED AT 0700. ASSESSMENTS AS CHARTED. MEDICATION AS CHARTED.
COLONOSCOPY TOMORROW. NPO AFTER 0000. GOLYTELY AT BEDSIDE PT INSTRUCTED. PT
FINISHED 2ND OF 2 UNITS RBC AT 0730.

## 2020-06-08 VITALS — SYSTOLIC BLOOD PRESSURE: 118 MMHG | DIASTOLIC BLOOD PRESSURE: 48 MMHG

## 2020-06-08 VITALS — DIASTOLIC BLOOD PRESSURE: 42 MMHG | SYSTOLIC BLOOD PRESSURE: 121 MMHG

## 2020-06-08 VITALS — DIASTOLIC BLOOD PRESSURE: 42 MMHG | SYSTOLIC BLOOD PRESSURE: 119 MMHG

## 2020-06-08 VITALS — SYSTOLIC BLOOD PRESSURE: 127 MMHG | DIASTOLIC BLOOD PRESSURE: 57 MMHG

## 2020-06-08 VITALS — SYSTOLIC BLOOD PRESSURE: 132 MMHG | DIASTOLIC BLOOD PRESSURE: 55 MMHG

## 2020-06-08 VITALS — SYSTOLIC BLOOD PRESSURE: 124 MMHG | DIASTOLIC BLOOD PRESSURE: 75 MMHG

## 2020-06-08 VITALS — SYSTOLIC BLOOD PRESSURE: 110 MMHG | DIASTOLIC BLOOD PRESSURE: 57 MMHG

## 2020-06-08 LAB
ALBUMIN SERPL-MCNC: 3 G/DL (ref 3.4–5)
ANION GAP SERPL CALC-SCNC: 6 MMOL/L (ref 7–16)
BUN SERPL-MCNC: 40 MG/DL (ref 7–18)
CALCIUM SERPL-MCNC: 8.5 MG/DL (ref 8.5–10.1)
CHLORIDE SERPL-SCNC: 111 MMOL/L (ref 98–107)
CO2 SERPL-SCNC: 29 MMOL/L (ref 21–32)
CREAT SERPL-MCNC: 1.2 MG/DL (ref 0.7–1.3)
ERYTHROCYTE [DISTWIDTH] IN BLOOD BY AUTOMATED COUNT: 17.7 % (ref 10.5–14.5)
GLUCOSE SERPL-MCNC: 113 MG/DL (ref 74–106)
HCT VFR BLD CALC: 21.1 % (ref 42–52)
HGB BLD-MCNC: 7.1 GM/DL (ref 14–18)
MCH RBC QN AUTO: 33.8 PG (ref 26–34)
MCHC RBC AUTO-ENTMCNC: 33.5 G/DL (ref 28–37)
MCV RBC: 100.7 FL (ref 80–100)
PHOSPHATE SERPL-MCNC: 3 MG/DL (ref 2.5–4.9)
PLATELET # BLD: 167 THOU/UL (ref 150–400)
POTASSIUM SERPL-SCNC: 4 MMOL/L (ref 3.5–5.1)
RBC # BLD AUTO: 2.1 MIL/UL (ref 4.5–6)
SODIUM SERPL-SCNC: 146 MMOL/L (ref 136–145)
WBC # BLD AUTO: 7.8 THOU/UL (ref 4–11)

## 2020-06-08 PROCEDURE — 4B02XSZ MEASUREMENT OF CARDIAC PACEMAKER, EXTERNAL APPROACH: ICD-10-PCS | Performed by: INTERNAL MEDICINE

## 2020-06-08 NOTE — 2DMMODE
[facility]
[address]
[place]   [address]                   2 D/M-MODE ECHOCARDIOGRAM     
_______________________________________________________________________________
 
Name:       LYNDSEY MERINO               Room #:         201-P       ADM IN  
M.R.#:      [id number]                       Account #:      [id number]  
Admission:  20    Attend Phys:    Selena Azul
Discharge:              Date of Birth:  [birth year]  
                                                          Report #: [id number]
                                                                    [id number]
_______________________________________________________________________________
THIS REPORT FOR:  
 
cc:  [facility] physician unknown
     [facility] physician unknown
     Escobar Landry MD                                        
                                                                       ~
 
--------------- APPROVED REPORT --------------
 
 
Study performed:  2020 11:20:44
 
EXAM: Comprehensive 2D, Doppler, and color-flow 
Echocardiogram 
Patient Location: Bedside   
Room #:  203     Status:  routine
 
        BSA:         2.06
HR: 69 bpm   BP:          121/42 mmHg 
Rhythm: Atrial Fibrillation     
 
Other Information 
Study Quality: Good
 
Indications
Aortic valve replacement; looking for severity of perivalvular leak, 
anemia.
Hx: CHF, PAF, pacemaker, HTN, HLP, DM.
 
2D Dimensions
RVDd:  51.84 mm  
IVSd:  11.00 (7-11mm) 
LVDd:  59.39 mm  
PWd:  11.40 (7-11mm) Ascending Ao:  41.48 (22-36mm)
LVDs:  46.22 (25-40mm) 
Aortic Root:  35.41 mm 
 
Volumes
Left Atrial Volume (Systole) 
Single Plane 4CH:  107.11 mL Single Plane 2CH:  90.96 mL
    LA ESV Index:  52.00 mL/m2
 
Aortic Valve
AoV Peak Hardy.:  2.81 m/s 
AO Peak Gr.:  31.58 mmHg LVOT Max P.83 mmHg
AO Mean Gr.:  15.80 mmHg 
 
 
[facility]
[address]
[place]  [address]
Phone:  [phone]                    2 D/M-MODE ECHOCARDIOGRAM     
_______________________________________________________________________________
 
Name:            LYNDSEY MERINO               Room #:        201-P       [facility] [place]
[facility]#:           [id number]          Account #:     [id number]  
Admission:       20         Attend Phys:   Selena Smith
Discharge:                  Date of Birth: [birth year]  
                         Report #:      [id number]
        [id number]
_______________________________________________________________________________
AO V2 Mean:  1.89 m/s  LVOT Max V:  1.31 m/s
AO V2 VTI:  61.94 cm  
 
Mitral Valve
    MV Decel. Time:  70.09 ms
MV E Max Hardy.:  1.44 m/s 
 
Pulmonary Valve
PV Peak Hardy.:  0.88 m/s PV Peak Gr.:  3.11 mmHg
 
Tricuspid Valve
TR Peak Hardy.:  3.70 m/s  RAP Estimate:  10.00 mmHg
TR Peak Gr.:  55.00 mmHg 
    PA Pressure:  65.00 mmHg
 
Left Ventricle
Left ventricle is mildly dilated. There is normal left ventricular 
wall thickness. Left ventricular systolic function is mild to 
moderately decreased. LVEF is 40-45%. This study is not technically 
sufficient to allow evaluation of the LV diastolic function due to 
atrial fibrillation.
 
Right Ventricle
Right ventricle is dilated. The right ventricular systolic function 
is normal. Pacemaker lead is present in the right ventricle. 
 
Atria
Left atrium is severely dilated. Right atrium is moderately 
dilated.
 
Aortic Valve
A [employer]. Pedrito Bioprosthetic aortic valve is present. Moderate aortic 
indufficiency; probably perivalvular. AUREA from 2019 reports segment 
of valve dihiscence. Peak pressure gradient through the valve is 
32mmHg and a mean of 16mmHg. 
 
Mitral Valve
Mitral valve leaflets are mildly thickened. Mild mitral annular 
calcification. Moderate mitral regurgitation. No evidence of mitral 
valve stenosis.
 
Tricuspid Valve
The tricuspid valve is normal in structure. Moderate tricuspid 
regurgitation. Estimated PAP is 65mmHg.
 
Pulmonic Valve
 
 
[facility]
[address]
[place]  [address]
Phone:  [phone]                    2 D/M-MODE ECHOCARDIOGRAM     
_______________________________________________________________________________
 
Name:            LYNDSEY MERINO               Room #:        201-P       [facility] [place]
M.R.#:           [id number]          Account #:     [id number]  
Admission:       20         Attend Phys:   Selena Smith
Discharge:                  Date of Birth: [birth year]  
                         Report #:      [id number]
        [id number]
_______________________________________________________________________________
The pulmonary valve is normal in structure. Mild pulmonic 
regurgitation.
 
Great Vessels
The aortic root is normal in size. Ascending aorta is dilated at 4.1. 
IVC is dilated and collapses >50% with 
inspiration.
 
Pericardium
There is no pericardial effusion.
 
<Conclusion>
Left ventricle is mildly dilated.
LVEF is 40-45%.
Right ventricle is dilated.
The right ventricular systolic function is normal.
Pacemaker lead is present in the right ventricle.
Left atrium is severely dilated.
Right atrium is moderately dilated.
A [employer]. Pedrito Bioprosthetic aortic valve is present. Moderate aortic 
indufficiency; probably perivalvular. AUREA from 2019 reports segment 
of valve dihiscence. Peak pressure gradient through the valve is 
32mmHg and a mean of 16mmHg.
Mitral valve leaflets are mildly thickened.
Mild mitral annular calcification.
Moderate mitral regurgitation.
The tricuspid valve is normal in structure.
Moderate tricuspid regurgitation. Estimated PAP is 65mmHg.
Ascending aorta is dilated at 4.1.
There is no pericardial effusion.
 
 
 
 
 
 
 
 
 
 
 
 
 
 
  <ELECTRONICALLY SIGNED>
   By: Escobar Landry MD    
  20     1233
D: 20 1233                           _____________________________________
T: 20 1233                           Escobar Landry MD      /INF

## 2020-06-08 NOTE — NUR
ASSUMMED PT CARE AT APPROXIMATELY 0700. PT A&O X4. ASSESSMENT AS CHARTED. FALL
PRECAUTIONS IN PLACE. PT DENIES HAVING CHEST PAIN. PT DENIES HAVING SOB. PT
DENIES HAVING ACUTE PAIN. EDUCATED PT ABOUT POC. PT STATED UNDERSTANDING AND
DENIED HAVING FURTHER QUESTIONS. PT HAD EGD AND COLONOSCOPY PROCEDURE TODAY.
SPOKE C COLEMAN MCKEON NP REGAURDING RESULTS AND PT'S HGB LEVEL. COLEMAN MCKEON NP STATED UNDERSTANDING AND DENIED HAVING NEW ORDERS. VITAL SIGNS
STABLE. BLOOD SUGARS STABLE. PT COMFORTABLE. PT AMBULATES STEADY C STANDBY
ASSIST. PT DENIES HAVING FURTHER CONCERNS.

## 2020-06-08 NOTE — NUR
ASSESSMENT AS DOCUMENTED.PT BEEN RESTING IN NO ACUTE
DISTRESS.A/OX4/VSS.COMPLETED BOWEL PREP,CLEAR THIS AM.NPO AFTER MIDNOC.POC IS
TO HAVE COLONOSCPY TODAY.WILL CONT TO MONITOR PER POC.

## 2020-06-08 NOTE — NUR
chart review. pt is going to go for colonscopy today. cm tried calling pt in
room, no answer. will cont following as needed for dcp.

## 2020-06-09 VITALS — DIASTOLIC BLOOD PRESSURE: 51 MMHG | SYSTOLIC BLOOD PRESSURE: 117 MMHG

## 2020-06-09 VITALS — SYSTOLIC BLOOD PRESSURE: 126 MMHG | DIASTOLIC BLOOD PRESSURE: 48 MMHG

## 2020-06-09 VITALS — SYSTOLIC BLOOD PRESSURE: 116 MMHG | DIASTOLIC BLOOD PRESSURE: 54 MMHG

## 2020-06-09 VITALS — SYSTOLIC BLOOD PRESSURE: 109 MMHG | DIASTOLIC BLOOD PRESSURE: 49 MMHG

## 2020-06-09 VITALS — DIASTOLIC BLOOD PRESSURE: 48 MMHG | SYSTOLIC BLOOD PRESSURE: 111 MMHG

## 2020-06-09 VITALS — DIASTOLIC BLOOD PRESSURE: 82 MMHG | SYSTOLIC BLOOD PRESSURE: 114 MMHG

## 2020-06-09 LAB
ANION GAP SERPL CALC-SCNC: 6 MMOL/L (ref 7–16)
BUN SERPL-MCNC: 22 MG/DL (ref 7–18)
CALCIUM SERPL-MCNC: 8.3 MG/DL (ref 8.5–10.1)
CHLORIDE SERPL-SCNC: 110 MMOL/L (ref 98–107)
CO2 SERPL-SCNC: 26 MMOL/L (ref 21–32)
CREAT SERPL-MCNC: 1.1 MG/DL (ref 0.7–1.3)
ERYTHROCYTE [DISTWIDTH] IN BLOOD BY AUTOMATED COUNT: 17.9 % (ref 10.5–14.5)
GLUCOSE SERPL-MCNC: 107 MG/DL (ref 74–106)
HCT VFR BLD CALC: 18.9 % (ref 42–52)
HGB BLD-MCNC: 6.3 GM/DL (ref 14–18)
MCH RBC QN AUTO: 34.2 PG (ref 26–34)
MCHC RBC AUTO-ENTMCNC: 33.5 G/DL (ref 28–37)
MCV RBC: 102.1 FL (ref 80–100)
PLATELET # BLD: 159 THOU/UL (ref 150–400)
POTASSIUM SERPL-SCNC: 4.1 MMOL/L (ref 3.5–5.1)
RBC # BLD AUTO: 1.86 MIL/UL (ref 4.5–6)
SODIUM SERPL-SCNC: 142 MMOL/L (ref 136–145)
WBC # BLD AUTO: 7.8 THOU/UL (ref 4–11)

## 2020-06-09 NOTE — NUR
ASSUMED CARE PT SHIFT CHANGE. ASSESSMENTS AS CHARTED BY THIS NURSE AND NURSE
ORIENTEE JAQUELIN. PT ALERT AND ORIENTED.VSS. DENIES PAIN. O2 SATS WNL ON
ROOM AIR. PT TRANSFUSED WITH 1 UNIT PRBC THIS AM. PT SEEN BY GI- M2 CAPSULE
STUDY ORDERED. PT UP SBA TOLERATING WELL. URINE OUTPUT ADEQUATE. PT DID NOT
PASS BLOODY STOOLS THIS SHIFT. NO SIGNS OF BLEEDING ELSEWHERE. PT CURRENTLY
SITTING UP IN BED EATING DINNER DENYING OF NEEDS. WILL CONTINUE TO MONITOR PT
AND FOLLOW POC. WILL PASS ON REPORT TO NOC RN.

## 2020-06-09 NOTE — NUR
ASSUMED PT CARE AT 1900. PT IS ALERT AND ORIENTED. NO SIGN OF DISTRESS NOTED
IN PT. DENIES ANY PAIN. CALL LIGHT WITHIN REACH, FALL PRECAUTION IN PLACE.
ASSESSMENT COMPLETED AND DOCUMENTED. SCHEDULED MEDS ADMINISTERED TO PT.
TOLERATED PO INTAKE, PT IS STABLE THROUGHTHE NIGHT. VITAL SIGNS STABLE.
CONTINUE TO MONITOR PATIENT. NO FURTHER NEEDS AT THIS TIME.

## 2020-06-09 NOTE — PATH
[facility]
[address]
[place]   [address]                     PATHOLOGY RPT PROCEDURE       
_______________________________________________________________________________
 
Name:       ISAIAH VILLANUEVA ARPITA               Room #:         201-P       [facility] [place]  
M.R.#:      [id number]     Account #:      [id number]  
Admission:  06/06/20    Date of Birth:  [birth year]  
Discharge:                                              Report #:    [id number]
                                                        Path Case #: [id number]
_______________________________________________________________________________
 
[facility] Accession Number: [id number]
.                                                                01
Material submitted:                                        .
PART A: stomach - BIOPSY OF GASTRITIS R/O H. PYLORI
PART B: esophagus - BIOPSY OF DISTAL ESOPHAGUS R/O SERRA'S. Modifiers:
distal
PART C: colon - POLYP AT 20CM
PART D: colon - POLYP AT PROXIMAL ASCENDING COLON. Modifiers: proximal,
ascending
PART E: colon - POLYP AT MID ASCENDING COLON. Modifiers: mid, ascending
PART F: colon - POLYP AT DISTAL TRANSVERSE COLON X2. Modifiers: distal,
transverse
.                                                                01
Clinical history:                                          .
anemia
.                                                                02
**********************************************************************
Diagnosis:
A.  Gastric mucosa, gastritis, endoscopic biopsy:
- Mild chronic gastritis.
- Negative for intestinal metaplasia or atrophy.
- Negative for Helicobacter pylori (properly-controlled
immunohistochemical stain performed).
.
B.  Gastroesophageal mucosa, distal esophagus rule out Serra's,
endoscopic biopsy:
- Specialized columnar epithelium (gastric cardia-type mucosa) with
intestinal metaplasia, consistent with Serra's metaplasia; negative for
dysplasia.
- Focal squamous mucosa showing mild esophagitis.
.
C.  Polyp, at 20 cm, endoscopic biopsy:
- Tubular adenoma.
- Negative for high grade dysplasia.
.
D.  Polyp, at proximal ascending colon, endoscopic biopsy:
- Tubular adenoma, multiple fragments.
- Negative for high grade dysplasia.
.
E.  Polyp, at mid ascending colon, endoscopic biopsy:
- Tubular adenoma.
- Negative for high grade dysplasia.
.
F.  Polyp x2, at distal transverse colon, endoscopic biopsy:
- One fragment showing tubular adenoma without high grade dysplasia.
- One fragment showing hyperplastic polyp without dysplasia.
.
 
 
[facility]
[address]
[place]   [address]                     PATHOLOGY RPT PROCEDURE       
_______________________________________________________________________________
 
Name:       ISAIAH VILLANUEVA               Room #:         201-P       [facility].#:      [id number]     Account #:      [id number]  
Admission:  06/06/20    Date of Birth:  [birth year]  
Discharge:                                              Report #:    [id number]
                                                        Path Case #: [id number]
_______________________________________________________________________________
(IUV:mml; 06/9/2020)
[facility]  06/09/2020  [facility]
**********************************************************************
.                                                                02
Electronically signed:                                     .
Saniya Sheridan MD, Pathologist
NPI- [id number]
.                                                                01
Gross description:                                         .
A.  The specimen is received in formalin labeled "Isaiah Villanueva, BX of
gastritis rule out H. pylori" and consists of fragments of pink-tan tissue
measuring 1.7 x 0.3 x 0.2 cm in aggregate which are entirely submitted in
A1.
.
B.  The specimen is received in formalin labeled "Isaiah Villanueva, BX of
distal esophagus to rule out Serra's" and consists of fragments of
pink-tan tissue measuring 1.4 x 0.3 x 0.3 cm in aggregate which are
entirely submitted in B1.
.
C.  The specimen is received in formalin labeled "Isaiah Villanueva, polyp at
20 cm" and consists of multiple fragments of tan tissue measuring 1.7 x
0.9 x 0.3 cm in aggregate which are entirely submitted in C1.
.
D.  The specimen is received in formalin labeled "Isaiah Villanueva, polyp at
proximal ascending colon" and consists of multiple fragments of tan tissue
measuring 1.8 x 0.9 x 0.3 cm in aggregate which are entirely submitted in
D1.
.
E.  The specimen is received in formalin labeled "Isaiah Villanueva, polyp at
mid ascending colon"and consists of a polypoid segment of tan tissue
measuring 0.6 x 0.4 x 0.3 cm which is entirely submitted in E1.
.
F. The specimen is received in formalin, labeled "Isaiah Villanueva, polyp at
distal transverse colon x2" and consists of multiple fragments of
white-tan tissue measuring 0.6 x 0.5 x 0.2 cm in aggregate which are
entirely submitted in F1.
(MAKEDA; 6/8/2020)
JFQ/JAIRO  06/08/2020  [facility]
.                                                                02
Pathologist provided ICD-10:
K29.50, K22.70, D12.6, D12.2, D12.3
.                                                                02
CPT                                                        .
864102, 905579, 774090, 458208, 441864, 690941, [id number]
Specimen Comment: A courtesy copy of this report has been sent to [phone],
[phone]
Specimen Comment: [id number]
Specimen Comment: Report sent to  / DR EL
 
 
[address]   [address]                     PATHOLOGY RPT PROCEDURE       
_______________________________________________________________________________
 
Name:       ISAIAH VILLANUEVA               Room #:         201-P       [facility] [place]  
M.R.#:      [id number]     Account #:      [id number]  
Admission:  06/06/20    Date of Birth:  [birth year]  
Discharge:                                              Report #:    [id number]
                                                        Path Case #: [id number]
_______________________________________________________________________________
***Performed at:  01
   [facility]
   [address], [place], [place]  [id number]
   MD Victor Manuel Alanis MD Phone:  [phone]
***Performed at:  02
   [facility] [address]  [id number]
   MD Saniya Sheridan MD Phone:  [phone]

## 2020-06-09 NOTE — NUR
ASSUMED CARE PT SHIFT CHANGE. ASSESSMENT AS CHARTED.MEDS GIVEN PER MAR. PT
ALERT AND ORIENTED.VSS. DENIES PAIN. DENIES CP/SOB. DC ORDERS ACKNOWLEDGED AND
IMPLEMENTED. DISCUSSED PAPERWORK WITH PT, COMMUNICATES UNDERSTANDING. IV
REMOVED. TELE REMOVED. PT LEFT UNIT WITH ALL BELONGINGS.

## 2020-06-10 VITALS — SYSTOLIC BLOOD PRESSURE: 108 MMHG | DIASTOLIC BLOOD PRESSURE: 46 MMHG

## 2020-06-10 VITALS — SYSTOLIC BLOOD PRESSURE: 118 MMHG | DIASTOLIC BLOOD PRESSURE: 48 MMHG

## 2020-06-10 VITALS — DIASTOLIC BLOOD PRESSURE: 49 MMHG | SYSTOLIC BLOOD PRESSURE: 113 MMHG

## 2020-06-10 VITALS — DIASTOLIC BLOOD PRESSURE: 42 MMHG | SYSTOLIC BLOOD PRESSURE: 119 MMHG

## 2020-06-10 VITALS — DIASTOLIC BLOOD PRESSURE: 52 MMHG | SYSTOLIC BLOOD PRESSURE: 117 MMHG

## 2020-06-10 LAB
ERYTHROCYTE [DISTWIDTH] IN BLOOD BY AUTOMATED COUNT: 18.6 % (ref 10.5–14.5)
HCT VFR BLD CALC: 22.5 % (ref 42–52)
HGB BLD-MCNC: 7.8 GM/DL (ref 14–18)
MCH RBC QN AUTO: 33.6 PG (ref 26–34)
MCHC RBC AUTO-ENTMCNC: 34.6 G/DL (ref 28–37)
MCV RBC: 97.3 FL (ref 80–100)
PLATELET # BLD: 162 THOU/UL (ref 150–400)
RBC # BLD AUTO: 2.31 MIL/UL (ref 4.5–6)
WBC # BLD AUTO: 7.7 THOU/UL (ref 4–11)

## 2020-06-10 NOTE — HC
[facility]
[address]
[place]   [address]                     CONSULTATION                  
_______________________________________________________________________________
 
Name:       ANGELIQUELYNDSEY D               Room #:         201-P       ADM IN  
M.R.#:      [id number]                       Account #:      [id number]  
Admission:  20    Attend Phys:    Selena Azul
Discharge:              Date of Birth:  [birth year]  
                                                          Report #: [id number]
                                                                    [id number]   
_______________________________________________________________________________
THIS REPORT FOR:  
 
cc:  [facility] - Clinic physician unknown
     [facility] - [facility] physician unknown                                       
     Teresa Blanton MD                                          ~
CC: RENETTA Azul
 
REASON FOR CONSULTATION:  Elevated creatinine.
 
REASON FOR PRESENTATION:  Weakness and not feeling well.
 
HISTORY OF PRESENT ILLNESS:  This is a 69-year-old with extensive past medical
history including and not limited to cardiomyopathy with an ejection fraction of
around 40%.  He is status post bioprosthetic valve placement.  This was a while
ago in [place], [place].  He is known to have paroxysmal AFib with sick sinus
syndrome, status post pacemaker insertion.  He does not have any kidney issues
that he is aware of.  In fact, his creatinine back in 2019 was within the normal
range.  [facility] reporting that
he has been feeling extremely weak and lethargic, and was found to have very
significant anemia with a hemoglobin value of 5.2.  He was admitted to further
evaluate.  He denies hematemesis or melena.  On arrival to the Emergency Room,
the patient was found to have a creatinine of 2.6 that has gone down to 2.3. 
His blood pressure was little on the low side.  He denies any urinary symptoms. 
The patient was transfused and his hemoglobin has picked up.  He is currently
being investigated for his anemia and I was asked to evaluate his renal
function.
 
ALLERGIES:  None.
 
PAST MEDICAL HISTORY:  Extensive and includes the followin.  Cardiomyopathy with an ejection fraction of around 40%.
2.  Hyperlipidemia.
3.  Hypertension.
4.  Status post bioprosthetic aortic valve replacement with para-aortic valve
leak.
5.  Diabetes mellitus.
6.  Gout.
 
MEDICATIONS:
1.  Warfarin.
2.  Atorvastatin.
3.  Carvedilol.
4.  Losartan.
5.  Torsemide.
6.  Metformin.  
 
 
 
[address]   [address]                     CONSULTATION                  
_______________________________________________________________________________
 
Name:       LYNDSEY MERINO               Room #:         201-P       [facility] [place]  
..#:      [id number]                       Account #:      [id number]  
Admission:  20    Attend Phys:    Selena Azul
Discharge:              Date of Birth:  [birth year]  
                                                          Report #: [id number]
                                                                    [id number]   
_______________________________________________________________________________
 
OTHER NONE RELEVANT PAST MEDICAL HISTORY:  He tells me that he had history of
[place], Lyme disease.
 
REVIEW OF SYSTEMS:
GENERAL:  No fever or chills.
CARDIOVASCULAR:  No chest pain or palpitation.
PULMONARY:  No cough or hemoptysis.
GASTROINTESTINAL:  No nausea or vomiting.
GENITOURINARY:  No frequency, no urgency.
NEUROLOGICAL:  Extreme weakness including his lower extremities.
 
SOCIAL HISTORY:  Denies drug or alcohol abuse.
 
PHYSICAL EXAMINATION:
VITAL SIGNS:  Blood pressure is marginal at 120/57.
HEAD AND NECK:  No jugular venous distention.
CHEST:  No crackles.
CARDIOVASCULAR:  No rub.
ABDOMEN:  Soft, nontender.
LOWER EXTREMITIES:  No edema.
 
LABORATORY VALUES:  Revealed the following:  Sodium is 141, potassium is 4.4,
BUN is 111, creatinine is 2.3, hemoglobin was 5.2 yesterday and is up to 7.0.
 
IMPRESSION AND PLAN:
1.  Acute anemia.  
2.  Acute kidney injury.
3.  History of bioprosthetic valve.
4.  Cardiomyopathy.
5.  We will start the appropriate investigation for his acute kidney injury, but
this seems to be all prerenal.  Given his marginal blood pressure, I will
discontinue spironolactone and Cozaar.
6.  Anemia workup.
7.  Gentle IV fluid.
8.  Expect his renal function to continue to improve.
9.  GI consultation.
10.  We will continue to follow.
 
 
 
 
 
 
  <ELECTRONICALLY SIGNED>
   By: Teresa Blanton MD          
  06/10/20     0918
D: 20 0947                           _____________________________________
T: 20 1008                           Teresa Blanton MD            /nt

## 2020-06-10 NOTE — NUR
AAOX4. CALM, COOPERATIVE. NO C/O. DENIES CP. NO BM YET TODAY. V-PACED PER
TELE. LABS NOTED, HGB 7.8. WILL CONTINUE TO FOLLOW CLOSELY.

## 2020-06-10 NOTE — P
[facility]
[address]
[place]   [address]                     PROCEDURE REPORT              
_______________________________________________________________________________
 
Name:       LYNDSEY MERINO               Room #:         201-P       [facility] [place]  
M.R.#:      [id number]                       Account #:      [id number]  
Admission:  06/06/20    Attend Phys:    Selena Azul
Discharge:              Date of Birth:  [birth year]  
                                                          Report #: [id number]
                                                                    [id number]   
_______________________________________________________________________________
THIS REPORT FOR:  
 
cc:  [facility] - Clinic physician unknown
     [facility] - Clinic physician unknown                                       
     Chance Piper MD                                           ~
CC: RENETTA unknown
    Selena Azul
 
INPATIENT UPPER ENDOSCOPY
 
BRIEF HISTORY:  The patient is a 69-year-old male who had bloody stools in [place]
last winter, he had an upper endoscopy.  He does not recall specific findings. 
He was to have a colonoscopy, but because his wife was undergoing cancer
treatment, he canceled that procedure.  He was transferred to [facility] with marked anemia, Hemoccult positive stools and markedly elevated BUN.
 
PREOPERATIVE DIAGNOSIS:  Recurrent gastrointestinal bleeding.
 
POSTOPERATIVE DIAGNOSES:
1.  Diffuse gastritis without obvious bleeding.
2.  A 3 cm sliding type hiatus hernia.
3.  Suspected Mahan's esophagus, distal esophagus.
 
MEDICATIONS:  Deep sedation with propofol per anesthesia.
 
SPECIMENS:
1.  Biopsies of gastritis.
2.  Biopsies of distal esophagus, rule out Mahan.
 
ESTIMATED BLOOD LOSS:  3 mL.
 
PROCEDURE:  EGD with biopsy.
 
FINDINGS:  Prior to propofol sedation, procedure of upper endoscopy was
discussed with the patient as well as potential risks and its complications.  He
indicates he understands and desires to proceed.
 
DESCRIPTION OF PROCEDURE:  With the patient in left lateral decubitus position,
the Olympus video endoscope was inserted in the cervical esophagus under direct
vision without difficulty.  Examination of this organ through its entire style
revealed normal esophageal mucosa down the squamocolumnar junction.  The
squamocolumnar junction was very irregular.  There was the appearance of gastric
type mucosa and a probable 3 cm segment of Mahan's esophagus.  There were
multiple squamous islands and very irregular Z-line.  The mucosa was examined
with white light as well as narrow banded imaging and no ulcers, strictures,
masses, or bleeding lesions were seen.  Biopsies were obtained.  The mucosa was
 
 
 
[facility]
[address] [place]   [address]                     PROCEDURE REPORT              
_______________________________________________________________________________
 
Name:       LYNDSEY MERINO               Room #:         201-P       [facility] [place]  
..#:      [id number]                       Account #:      [id number]  
Admission:  06/06/20    Attend Phys:    Selena Azul
Discharge:              Date of Birth:  [birth year]  
                                                          Report #: [id number]
                                                                    [id number]   
_______________________________________________________________________________
flat.  The scope was advanced and a 3 cm sliding type hiatus hernia was
encountered.  Mucosa and hernia was unremarkable.  The scope was advanced fully
into the stomach, was examined on end view as well as retroflexed views.  There
was a pattern of diffuse gastritis in the antrum as well as the body and fundus
of the stomach with patchy erythema.  There was an occasional erosion, but no
ulcers were seen.  Upon retroflexion, no mass lesions were seen.  Multiple
biopsies were obtained.  The pylorus, duodenal bulb, and postbulbar duodenal
sweep were inspected and noted to be unremarkable.  At that point, the scope was
slowly withdrawn and careful circumferential views confirmed the above findings.
 The patient tolerated the procedure well.
 
Following procedure, he was drowsy and prepared for upper endoscopy.  Obvious
bleeding site was not identified.  Proceed with colonoscopy at this time.
 
 
 
 
 
 
 
 
 
 
 
 
 
 
 
 
 
 
 
 
 
 
 
 
 
 
 
 
 
 
 
  <ELECTRONICALLY SIGNED>
   By: Chance Piper MD           
  06/10/20     1752
D: 06/08/20 0915                           _____________________________________
T: 06/08/20 0951                           Chance Piper MD             /nt

## 2020-06-10 NOTE — NUR
ASSUMED PT CARE AT 1900. PT IS ALERT AND ORIENTED. NO SIGN OF DISTRESS NOTED
IN PT. PT IS AMBULATORY. ASSESSMENT COMPLETED AND DOCUMENTED. DENIES ANY PAIN.
SCHEDULED MEDS ADMINISTERED TO PT. TOLERATED PO INTAKE. CONTINUE TO MONITOR
PT. NO FURTHER NEEDS AT THIS TIME

## 2020-06-10 NOTE — P
[facility]
[address]
[place]   [address]                     PROCEDURE REPORT              
_______________________________________________________________________________
 
Name:       LYNDSEY MERINO ARPITA               Room #:         201-P       [facility] [place]  
M.R.#:      [id number]                       Account #:      [id number]  
Admission:  06/06/20    Attend Phys:    Selena Azul
Discharge:              Date of Birth:  [birth year]  
                                                          Report #: [id number]
                                                                    [id number]   
_______________________________________________________________________________
THIS REPORT FOR:  
 
cc:  [facility] - Clinic physician unknown
     [facility] - Clinic physician unknown                                       
     Chance Piper MD                                           ~
CC: [facility] unknown
    Selena Azul
 
INPATIENT COLONOSCOPY REPORT
 
BRIEF HISTORY:  The patient is a 69-year-old male who was admitted with anemia
and Hemoccult-positive stools and history of GI bleeding, on anticoagulation.
 
PREOPERATIVE DIAGNOSES:  Anemia and gastrointestinal bleeding.
 
POSTOPERATIVE DIAGNOSES:
1.  Multiple colon polyps.
2.  Small to moderate internal hemorrhoids.
 
MEDICATIONS:  Deep sedation with propofol per anesthesia.
 
SPECIMENS:
1.  Polyp from 20 cm.
2.  Proximal ascending colon polyp.
3.  Mid ascending colon polyp.
4.  Distal colon diminutive polyps x 2.
 
MEDICATIONS:  Deep sedation with propofol.
 
ESTIMATED BLOOD LOSS:  3 mL.
 
PROCEDURE:  Colonoscopy to cecum and terminal ileum with snare polypectomy and
biopsy.
 
FINDINGS:  Prior to propofol sedation, procedure of colonoscopy discussed with
the patient as well as potential risks and its complications.  He indicates he
understands and desires to proceed.
 
DESCRIPTION OF PROCEDURE:  With the patient in left lateral decubitus position,
digital examination was completed, which revealed no abnormalities. 
Subsequently, the Olympus video colonoscope was introduced into the rectum,
advanced under direct vision to the cecum and done with minimal difficulty.  The
cecum was identified by the ileocecal valve and the appendiceal orifice.  I was
able to visualize the distal 10-12 cm of the ileum.  There was no evidence of
blood or bleeding lesions or potential bleeding lesions.  At that point, the
scope was slowly withdrawn and careful circumferential views were obtained.  For
 
 
 
[facility]
[address]
[place]   [address]                     PROCEDURE REPORT              
_______________________________________________________________________________
 
Name:       LYNDSEY MERINO               Room #:         201-P       [facility] [place]  
.R.#:      [id number]                       Account #:      [id number]  
Admission:  06/06/20    Attend Phys:    Selena Azul
Discharge:              Date of Birth:  [birth year]  
                                                          Report #: [id number]
                                                                    [id number]   
_______________________________________________________________________________
the most part, the prep was good.  There were some limitations in the proximal
colon.  We cleaned up as well as possible, but not every last bit could be
removed.  Reasonably good views were obtained for the most part.  As we withdrew
the scope, a 5-7 mm sessile polyp was seen in the proximal ascending colon and
removed by cold snare polypectomy.  In the mid ascending colon, a 6-8 mm polyp
was seen and removed by cold snare polypectomy and recovered.  No additional
abnormalities were noted until the rectum was reached and no abnormalities were
seen in the rectum.  Upon retroflexion, small internal hemorrhoids were seen. 
Scope was withdrawn.  The patient tolerated the procedure well.
 
CONDITION OF THE PATIENT UPON DISCHARGE:  Following procedure, the patient
drowsy, aroused, conversant and will be discharged home when fully ambulatory. 
I have instructed the patient and family at the time of discharge.  We will
follow up on the pathology of the polyps; however, these lesions are relatively
small and probably not responsible for his significant GI bleeding or blood
loss.  Therefore, I think he would benefit from further evaluation such as an M2
capsule study.  We will follow up on pathology.  If 3 or more polyps are
adenomas, return in 3 years, otherwise return in 5 years.
 
This is the patient's first colonoscopy.  Withdrawal time from the cecum was 14
minutes 51 seconds.
 
 
 
 
 
 
 
 
 
 
 
 
 
 
 
 
 
 
 
 
 
 
 
  <ELECTRONICALLY SIGNED>
   By: Chance Piper MD           
  06/10/20     1752
D: 06/08/20 0946                           _____________________________________
T: 06/08/20 1011                           Chance Piper MD             /nt

## 2020-06-11 VITALS — DIASTOLIC BLOOD PRESSURE: 67 MMHG | SYSTOLIC BLOOD PRESSURE: 113 MMHG

## 2020-06-11 VITALS — SYSTOLIC BLOOD PRESSURE: 113 MMHG | DIASTOLIC BLOOD PRESSURE: 52 MMHG

## 2020-06-11 LAB
ANION GAP SERPL CALC-SCNC: 8 MMOL/L (ref 7–16)
BUN SERPL-MCNC: 20 MG/DL (ref 7–18)
CALCIUM SERPL-MCNC: 7.7 MG/DL (ref 8.5–10.1)
CHLORIDE SERPL-SCNC: 103 MMOL/L (ref 98–107)
CO2 SERPL-SCNC: 25 MMOL/L (ref 21–32)
CREAT SERPL-MCNC: 1.2 MG/DL (ref 0.7–1.3)
ERYTHROCYTE [DISTWIDTH] IN BLOOD BY AUTOMATED COUNT: 18.1 % (ref 10.5–14.5)
GLUCOSE SERPL-MCNC: 107 MG/DL (ref 74–106)
HCT VFR BLD CALC: 22.4 % (ref 42–52)
HGB BLD-MCNC: 7.7 GM/DL (ref 14–18)
MCH RBC QN AUTO: 33.4 PG (ref 26–34)
MCHC RBC AUTO-ENTMCNC: 34.3 G/DL (ref 28–37)
MCV RBC: 97.3 FL (ref 80–100)
PLATELET # BLD: 174 THOU/UL (ref 150–400)
POTASSIUM SERPL-SCNC: 3.7 MMOL/L (ref 3.5–5.1)
RBC # BLD AUTO: 2.3 MIL/UL (ref 4.5–6)
SODIUM SERPL-SCNC: 136 MMOL/L (ref 136–145)
WBC # BLD AUTO: 8.2 THOU/UL (ref 4–11)

## 2020-06-11 NOTE — NUR
ASSUMED PT CARE AT 1900. PT IS ALERT AND ORIENTED. NO SIGN OF DISTRESS NOTED
IN PT. DENIES ANY PAIN. PT IS AMBULATORY. VITAL SIGNS STABLE. PT VERBALIZED
THAT HE IS NOT TOO HAPPY ABOUT TRANSFERRING TO A DIFFERENT HOSPITAL. HE WAS
LOOKING FORWARD TO GOING HOME. ASSESSEMENT COMPLETED AND DOCUMENTED. SCHEDULED
MEDS ADMINISTERED TO PT. DENIES ANY FURTHER NEEDS AT THIS TIME.

## 2020-06-11 NOTE — NUR
Transfer request initiated with [facility] for double balloon endoscopy.
Pt accepted under Dr. Siddharth Baca rm [id number]. Pt updated at bedside and
agreeable. Transfer form signed. Nursing aware and they have given  report.
[facility] contacted for first available. Pt to notify his wife. Rad uploaded to the
cloud and chart copy ready to be sent with the pt. All parties aware of the dc
plan to acute inpt at [facility].

## 2020-06-11 NOTE — NUR
ASSESSMENT AS DOCUMENTED, VSS, AND AFEBRILE. REPORT GIVEN TO RECIEVING ADALBERTO RITTER FROM [facility], AND PATIENT KEPT NPO PER HER REQUEST.
WAITING FOR TRANSPORTATION TO TRANFER PATIENT.

## 2020-09-17 ENCOUNTER — HOSPITAL ENCOUNTER (OUTPATIENT)
Dept: HOSPITAL 35 - SJCVC | Age: 70
End: 2020-09-17
Attending: INTERNAL MEDICINE
Payer: COMMERCIAL

## 2020-09-17 DIAGNOSIS — I50.23: ICD-10-CM

## 2020-09-17 DIAGNOSIS — I11.0: Primary | ICD-10-CM

## 2020-09-17 DIAGNOSIS — Z79.899: ICD-10-CM

## 2020-09-17 DIAGNOSIS — Z98.890: ICD-10-CM

## 2020-09-17 DIAGNOSIS — I49.5: ICD-10-CM

## 2020-09-17 DIAGNOSIS — I48.0: ICD-10-CM

## 2020-09-17 DIAGNOSIS — Z87.891: ICD-10-CM

## 2020-09-17 DIAGNOSIS — I35.0: ICD-10-CM

## 2020-09-17 DIAGNOSIS — D68.59: ICD-10-CM

## 2020-09-17 DIAGNOSIS — Z95.0: ICD-10-CM

## 2020-09-17 DIAGNOSIS — Z95.2: ICD-10-CM

## 2021-05-20 ENCOUNTER — HOSPITAL ENCOUNTER (OUTPATIENT)
Dept: HOSPITAL 35 - SJCVCIMAG | Age: 71
End: 2021-05-20
Attending: INTERNAL MEDICINE
Payer: COMMERCIAL

## 2021-05-20 DIAGNOSIS — Z87.891: ICD-10-CM

## 2021-05-20 DIAGNOSIS — E78.5: ICD-10-CM

## 2021-05-20 DIAGNOSIS — E11.22: ICD-10-CM

## 2021-05-20 DIAGNOSIS — I08.3: Primary | ICD-10-CM

## 2021-05-20 DIAGNOSIS — Z86.19: ICD-10-CM

## 2021-05-20 DIAGNOSIS — N18.30: ICD-10-CM

## 2021-05-20 DIAGNOSIS — I48.91: ICD-10-CM

## 2021-05-20 DIAGNOSIS — E78.00: ICD-10-CM

## 2021-05-20 DIAGNOSIS — I50.21: ICD-10-CM

## 2021-05-20 DIAGNOSIS — I42.9: ICD-10-CM

## 2021-05-20 DIAGNOSIS — Z95.2: ICD-10-CM

## 2021-05-20 DIAGNOSIS — I25.10: ICD-10-CM

## 2021-05-20 DIAGNOSIS — I49.5: ICD-10-CM

## 2021-05-20 DIAGNOSIS — I13.0: ICD-10-CM

## 2021-05-20 DIAGNOSIS — Z79.84: ICD-10-CM

## 2021-05-20 DIAGNOSIS — Z95.0: ICD-10-CM

## 2021-05-20 DIAGNOSIS — Z79.899: ICD-10-CM

## 2021-09-15 ENCOUNTER — HOSPITAL ENCOUNTER (OUTPATIENT)
Dept: HOSPITAL 35 - SJCVC | Age: 71
End: 2021-09-15
Attending: INTERNAL MEDICINE
Payer: COMMERCIAL

## 2021-09-15 DIAGNOSIS — I35.0: ICD-10-CM

## 2021-09-15 DIAGNOSIS — Z72.89: ICD-10-CM

## 2021-09-15 DIAGNOSIS — E78.00: ICD-10-CM

## 2021-09-15 DIAGNOSIS — I42.9: ICD-10-CM

## 2021-09-15 DIAGNOSIS — N18.9: ICD-10-CM

## 2021-09-15 DIAGNOSIS — I48.91: Primary | ICD-10-CM

## 2021-09-15 DIAGNOSIS — D68.59: ICD-10-CM

## 2021-09-15 DIAGNOSIS — I50.9: ICD-10-CM

## 2021-09-15 DIAGNOSIS — Z86.19: ICD-10-CM

## 2021-09-15 DIAGNOSIS — Z87.891: ICD-10-CM

## 2021-09-15 DIAGNOSIS — I49.5: ICD-10-CM

## 2021-09-15 DIAGNOSIS — I13.0: ICD-10-CM

## 2021-09-15 DIAGNOSIS — Z79.899: ICD-10-CM

## 2023-11-09 NOTE — NUR
PT CARE ASSUMED APPROX 1600. PT ALERT AND ORIENTED X4. DENIES PAIN AND SOA.
VSS. UP WITH STEADY GAIT. TOLERATING POC. DENIES QUESTIONS OR CONCERNS
REGARDING POC. NO DISTRESS NOTED. [email or web address]